# Patient Record
Sex: MALE | URBAN - METROPOLITAN AREA
[De-identification: names, ages, dates, MRNs, and addresses within clinical notes are randomized per-mention and may not be internally consistent; named-entity substitution may affect disease eponyms.]

---

## 2022-11-29 ENCOUNTER — TELEPHONE (OUTPATIENT)
Dept: ENT CLINIC | Age: 52
End: 2022-11-29

## 2022-11-30 ENCOUNTER — OFFICE VISIT (OUTPATIENT)
Dept: ENT CLINIC | Age: 52
End: 2022-11-30

## 2022-11-30 ENCOUNTER — OFFICE VISIT (OUTPATIENT)
Dept: ENT CLINIC | Age: 52
End: 2022-11-30
Payer: COMMERCIAL

## 2022-11-30 VITALS
BODY MASS INDEX: 37.38 KG/M2 | DIASTOLIC BLOOD PRESSURE: 80 MMHG | HEIGHT: 71 IN | WEIGHT: 267 LBS | OXYGEN SATURATION: 95 % | HEART RATE: 89 BPM | SYSTOLIC BLOOD PRESSURE: 115 MMHG | RESPIRATION RATE: 16 BRPM

## 2022-11-30 DIAGNOSIS — J34.2 DNS (DEVIATED NASAL SEPTUM): ICD-10-CM

## 2022-11-30 DIAGNOSIS — H93.13 TINNITUS OF BOTH EARS: Primary | ICD-10-CM

## 2022-11-30 DIAGNOSIS — J31.0 CHRONIC RHINITIS: ICD-10-CM

## 2022-11-30 PROCEDURE — 99203 OFFICE O/P NEW LOW 30 MIN: CPT | Performed by: OTOLARYNGOLOGY

## 2022-11-30 PROCEDURE — 92557 COMPREHENSIVE HEARING TEST: CPT | Performed by: AUDIOLOGIST

## 2022-11-30 PROCEDURE — 92567 TYMPANOMETRY: CPT | Performed by: AUDIOLOGIST

## 2022-11-30 RX ORDER — METFORMIN HYDROCHLORIDE 500 MG/1
TABLET, EXTENDED RELEASE ORAL
COMMUNITY
Start: 2022-10-06

## 2022-11-30 NOTE — PROGRESS NOTES
Seaver Buford, a 50y.o. year old male, was seen in ENT clinic today for a hearing evaluation on referral from Dr. Yolanda Whitehead. Patient complains of bilateral tinnitus for many years. Patient has a history of occupational noise exposure ([place]) with use of hearing protection. Patient reports most recent hearing test through work indicated normal hearing. He denies ear pain, subjective hearing loss, or dizziness/imbalance. Otoscopy: normal external ear canals and visible tympanic membranes, bilaterally. Tympanometry: RE Type A, normal  LE Type A, normal    SRT: RE Speech Reception Threshold (SRT) was obtained at 10 dBHL LE Speech Reception Threshold (SRT) was obtained at 10 dBHL    WRS: RE Excellent in quiet when words were presented at 50 dBHL. WRS: LE Excellent in quiet when words were presented at 50 dBHL. Pure tone audiometry:  RE: WNL  LE: WNL    normal hearing thresholds bilaterally    Impressions:  normal hearing sensitivity  Discussed results of today's testing with patient. Patient's hearing is within normal limits. Discussed potential masking strategies for tinnitus (fan noise, sound therapy apps, etc.). Also discussed safe listening strategies for using earbuds, etc to avoid further noise damage. Provided tinnitus handout today. Recommended follow-up with ENT, with hearing recheck in 1 year or sooner if change in tinnitus and/or hearing is noted. Plan:  Follow-up with ENT.   Repeat audiogram upon request.    Alex Banda   Doctor of Audiology

## 2022-11-30 NOTE — PROGRESS NOTES
Subjective:    Seaver Buford   50 y.o.   1970     New Patient Visit    Location -ear, throat    Quality -tinnitus, ear foreign body    Severity -moderate    Duration -years    Timing -chronic    Context -patient with noise exposure history through work and [employer] and loud music presents with baseline tinnitus for a number of years, more recently had found a piece of a earbud in the left ear had removed it by himself with a tweezer after that felt some left-sided sore throat postnasal drainage and hoarseness which has since resolved    Modifying Features -none    Associated symptoms/signs -as above      Review of Systems  Review of Systems   Constitutional:  Negative for chills and fever. HENT:  Positive for tinnitus. Negative for ear pain, hearing loss and nosebleeds. Eyes:  Negative for blurred vision and double vision. Respiratory:  Negative for cough, sputum production and shortness of breath. Cardiovascular:  Negative for chest pain and palpitations. Gastrointestinal:  Negative for heartburn, nausea and vomiting. Musculoskeletal:  Negative for joint pain and neck pain. Skin: Negative. Neurological:  Negative for dizziness, speech change, weakness and headaches. Endo/Heme/Allergies:  Positive for environmental allergies. Does not bruise/bleed easily. Psychiatric/Behavioral:  Negative for memory loss. The patient does not have insomnia. History reviewed. No pertinent past medical history. History reviewed. No pertinent surgical history. History reviewed. No pertinent family history. Social History     Tobacco Use    Smoking status: Never    Smokeless tobacco: Not on file   Substance Use Topics    Alcohol use: Not on file      Prior to Admission medications    Medication Sig Start Date End Date Taking?  Authorizing Provider   metFORMIN ER (GLUCOPHAGE XR) 500 mg tablet TAKE 1 TABLET BY MOUTH EVERY DAY IN THE EVENING WITH FOOD 10/6/22  Yes Provider, Historical        Not on File      Objective:     Visit Vitals  /80   Pulse 89   Resp 16   Ht 5' 11\" (1.803 m)   Wt 267 lb (121.1 kg)   SpO2 95%   BMI 37.24 kg/m²        Physical Exam  Vitals reviewed. Constitutional:       General: He is awake. Appearance: Normal appearance. He is obese. HENT:      Head: Normocephalic and atraumatic. Jaw: There is normal jaw occlusion. No trismus, tenderness or malocclusion. Salivary Glands: Right salivary gland is not diffusely enlarged or tender. Left salivary gland is not diffusely enlarged or tender. Right Ear: Hearing, tympanic membrane, ear canal and external ear normal.      Left Ear: Hearing, tympanic membrane, ear canal and external ear normal.      Nose: Septal deviation present. No mucosal edema or rhinorrhea. Right Turbinates: Not enlarged, swollen or pale. Left Turbinates: Not enlarged, swollen or pale. Right Sinus: No maxillary sinus tenderness or frontal sinus tenderness. Left Sinus: No maxillary sinus tenderness or frontal sinus tenderness. Mouth/Throat:      Lips: Pink. Mouth: Mucous membranes are moist. No oral lesions. Dentition: Normal dentition. No gum lesions. Tongue: No lesions. Palate: No mass and lesions. Pharynx: Oropharynx is clear. Uvula midline. Tonsils: No tonsillar exudate. 0 on the right. 0 on the left. Eyes:      General: Vision grossly intact. Extraocular Movements: Extraocular movements intact. Right eye: No nystagmus. Left eye: No nystagmus. Pupils: Pupils are equal, round, and reactive to light. Neck:      Thyroid: No thyroid mass, thyromegaly or thyroid tenderness. Trachea: Trachea and phonation normal. No tracheal tenderness. Cardiovascular:      Rate and Rhythm: Normal rate and regular rhythm. Pulmonary:      Effort: Pulmonary effort is normal.      Breath sounds: Normal breath sounds. No stridor. No wheezing.    Musculoskeletal:         General: Normal range of motion. Cervical back: Normal range of motion. No edema or erythema. Lymphadenopathy:      Cervical: No cervical adenopathy. Skin:     General: Skin is warm and dry. Neurological:      General: No focal deficit present. Mental Status: He is alert and oriented to person, place, and time. Mental status is at baseline. Coordination: Romberg sign negative. Gait: Gait is intact. Psychiatric:         Mood and Affect: Mood normal.         Behavior: Behavior normal. Behavior is cooperative. Assessment/Plan:     Encounter Diagnoses   Name Primary? Tinnitus of both ears Yes    Chronic rhinitis     DNS (deviated nasal septum)      Audiogram reviewed with patient. He overall has normal hearing thresholds bilaterally although he is at the borderline at the higher frequencies. We discussed tinnitus strategies and he is given handout with further information. Strongly stressed the importance of ear protection from noise. He will return as needed. Orders Placed This Encounter    metFORMIN ER (GLUCOPHAGE XR) 500 mg tablet           Thank you for referring this patient,    True Vu MD, [address]    [address]  [place] [address] [address]  [place], [place] [address] [place] [address]

## 2022-11-30 NOTE — LETTER
11/30/2022    Patient: Martha Raymond   YOB: 1970   Date of Visit: 11/30/2022     Leoncio Cadena MD  [address] [address]  Via Fax: [phone]    Dear Leoncio Cadena MD,      Thank you for referring Mr. Martha Raymond to [facility] [address] for evaluation. My notes for this consultation are attached. If you have questions, please do not hesitate to call me. I look forward to following your patient along with you.       Sincerely,    Elba Mortensen MD

## 2022-11-30 NOTE — PROGRESS NOTES
Chief Complaint   Patient presents with    Foreign Body in [address] with drainage of left ear. PT had an ear bud in his ear    Ringing in Ear     Visit Vitals  /80   Pulse 89   Resp 16   Ht 5' 11\" (1.803 m)   Wt 267 lb (121.1 kg)   SpO2 95%   BMI 37.24 kg/m²     1. Have you been to the ER, urgent care clinic since your last visit? Hospitalized since your last visit? No    2. Have you seen or consulted any other health care providers outside of the [address] [place] [address] since your last visit? Include any pap smears or colon screening.  No